# Patient Record
Sex: FEMALE | Race: WHITE
[De-identification: names, ages, dates, MRNs, and addresses within clinical notes are randomized per-mention and may not be internally consistent; named-entity substitution may affect disease eponyms.]

---

## 2022-06-23 ENCOUNTER — HOSPITAL ENCOUNTER (INPATIENT)
Dept: HOSPITAL 54 - ER | Age: 82
LOS: 2 days | Discharge: NURSING, REHAB OR LONG-TERM CARE | DRG: 368 | End: 2022-06-25
Attending: NURSE PRACTITIONER | Admitting: NURSE PRACTITIONER
Payer: COMMERCIAL

## 2022-06-23 VITALS — WEIGHT: 158 LBS | BODY MASS INDEX: 29.83 KG/M2 | HEIGHT: 61 IN

## 2022-06-23 DIAGNOSIS — M19.90: ICD-10-CM

## 2022-06-23 DIAGNOSIS — K21.01: Primary | ICD-10-CM

## 2022-06-23 DIAGNOSIS — F41.9: ICD-10-CM

## 2022-06-23 DIAGNOSIS — K29.71: ICD-10-CM

## 2022-06-23 DIAGNOSIS — F32.9: ICD-10-CM

## 2022-06-23 DIAGNOSIS — K57.30: ICD-10-CM

## 2022-06-23 DIAGNOSIS — Z20.822: ICD-10-CM

## 2022-06-23 DIAGNOSIS — R79.89: ICD-10-CM

## 2022-06-23 DIAGNOSIS — E87.5: ICD-10-CM

## 2022-06-23 DIAGNOSIS — I10: ICD-10-CM

## 2022-06-23 DIAGNOSIS — H93.19: ICD-10-CM

## 2022-06-23 DIAGNOSIS — F02.80: ICD-10-CM

## 2022-06-23 DIAGNOSIS — F25.9: ICD-10-CM

## 2022-06-23 DIAGNOSIS — K44.9: ICD-10-CM

## 2022-06-23 DIAGNOSIS — Z86.16: ICD-10-CM

## 2022-06-23 DIAGNOSIS — E78.5: ICD-10-CM

## 2022-06-23 DIAGNOSIS — G30.0: ICD-10-CM

## 2022-06-23 LAB
ALBUMIN SERPL BCP-MCNC: 3.3 G/DL (ref 3.4–5)
ALP SERPL-CCNC: 72 U/L (ref 46–116)
ALT SERPL W P-5'-P-CCNC: 18 U/L (ref 12–78)
AST SERPL W P-5'-P-CCNC: 36 U/L (ref 15–37)
BASOPHILS # BLD AUTO: 0.1 K/UL (ref 0–0.2)
BASOPHILS NFR BLD AUTO: 1.1 % (ref 0–2)
BILIRUB DIRECT SERPL-MCNC: 0 MG/DL (ref 0–0.2)
BILIRUB SERPL-MCNC: 0.3 MG/DL (ref 0.2–1)
BUN SERPL-MCNC: 24 MG/DL (ref 7–18)
CALCIUM SERPL-MCNC: 9.1 MG/DL (ref 8.5–10.1)
CHLORIDE SERPL-SCNC: 109 MMOL/L (ref 98–107)
CO2 SERPL-SCNC: 22 MMOL/L (ref 21–32)
CREAT SERPL-MCNC: 0.7 MG/DL (ref 0.6–1.3)
EOSINOPHIL NFR BLD AUTO: 2.8 % (ref 0–6)
GLUCOSE SERPL-MCNC: 108 MG/DL (ref 74–106)
HCT VFR BLD AUTO: 38 % (ref 33–45)
HGB BLD-MCNC: 12.6 G/DL (ref 11.5–14.8)
LIPASE SERPL-CCNC: 115 U/L (ref 73–393)
LYMPHOCYTES NFR BLD AUTO: 1.6 K/UL (ref 0.8–4.8)
LYMPHOCYTES NFR BLD AUTO: 23.6 % (ref 20–44)
MCHC RBC AUTO-ENTMCNC: 33 G/DL (ref 31–36)
MCV RBC AUTO: 85 FL (ref 82–100)
MONOCYTES NFR BLD AUTO: 0.6 K/UL (ref 0.1–1.3)
MONOCYTES NFR BLD AUTO: 8.3 % (ref 2–12)
NEUTROPHILS # BLD AUTO: 4.4 K/UL (ref 1.8–8.9)
NEUTROPHILS NFR BLD AUTO: 64.2 % (ref 43–81)
PLATELET # BLD AUTO: 265 K/UL (ref 150–450)
POTASSIUM SERPL-SCNC: 5.3 MMOL/L (ref 3.5–5.1)
PROT SERPL-MCNC: 7.2 G/DL (ref 6.4–8.2)
RBC # BLD AUTO: 4.5 MIL/UL (ref 4–5.2)
SODIUM SERPL-SCNC: 140 MMOL/L (ref 136–145)
WBC NRBC COR # BLD AUTO: 6.8 K/UL (ref 4.3–11)

## 2022-06-23 PROCEDURE — C9803 HOPD COVID-19 SPEC COLLECT: HCPCS

## 2022-06-23 PROCEDURE — C9113 INJ PANTOPRAZOLE SODIUM, VIA: HCPCS

## 2022-06-23 PROCEDURE — G0378 HOSPITAL OBSERVATION PER HR: HCPCS

## 2022-06-23 NOTE — NUR
BIBRA 860 C/O COUGHED UP DARK COFFEE GROUND BLOOD MIXED WITH SPUTUM. (+) COUGH. 
ALSO C/O OF BURPING AND EPISODES OF EMESIS. PLACED COMFORTABLY IN BED. VITALS 
CHECKED. ATTACHED TO MONITOR.

## 2022-06-23 NOTE — NUR
PATIENT IS CONFUSED. REALLY WANTS TO GO HOME. SHE WAS SURPRISED WHY SHE IS IN 
THE ER AND SHE DENIED THAT SHE WAS COUGHING AND VOMITED COFFEE COLORED VOMITUS. 
IT WAS EXPLAINED TO HER THAT SHE NEEDS TO BE ADMITTED TO BE MONITORED.

## 2022-06-24 VITALS — DIASTOLIC BLOOD PRESSURE: 65 MMHG | SYSTOLIC BLOOD PRESSURE: 127 MMHG

## 2022-06-24 VITALS — SYSTOLIC BLOOD PRESSURE: 163 MMHG | DIASTOLIC BLOOD PRESSURE: 88 MMHG

## 2022-06-24 VITALS — DIASTOLIC BLOOD PRESSURE: 86 MMHG | SYSTOLIC BLOOD PRESSURE: 156 MMHG

## 2022-06-24 VITALS — DIASTOLIC BLOOD PRESSURE: 75 MMHG | SYSTOLIC BLOOD PRESSURE: 159 MMHG

## 2022-06-24 VITALS — SYSTOLIC BLOOD PRESSURE: 150 MMHG | DIASTOLIC BLOOD PRESSURE: 46 MMHG

## 2022-06-24 VITALS — DIASTOLIC BLOOD PRESSURE: 66 MMHG | SYSTOLIC BLOOD PRESSURE: 140 MMHG

## 2022-06-24 VITALS — SYSTOLIC BLOOD PRESSURE: 146 MMHG | DIASTOLIC BLOOD PRESSURE: 93 MMHG

## 2022-06-24 VITALS — SYSTOLIC BLOOD PRESSURE: 156 MMHG | DIASTOLIC BLOOD PRESSURE: 78 MMHG

## 2022-06-24 LAB
ALBUMIN SERPL BCP-MCNC: 3.2 G/DL (ref 3.4–5)
ALP SERPL-CCNC: 63 U/L (ref 46–116)
ALT SERPL W P-5'-P-CCNC: 13 U/L (ref 12–78)
AST SERPL W P-5'-P-CCNC: 14 U/L (ref 15–37)
BASOPHILS # BLD AUTO: 0 K/UL (ref 0–0.2)
BASOPHILS NFR BLD AUTO: 0.8 % (ref 0–2)
BILIRUB SERPL-MCNC: 0.3 MG/DL (ref 0.2–1)
BUN SERPL-MCNC: 16 MG/DL (ref 7–18)
CALCIUM SERPL-MCNC: 8.5 MG/DL (ref 8.5–10.1)
CHLORIDE SERPL-SCNC: 110 MMOL/L (ref 98–107)
CO2 SERPL-SCNC: 29 MMOL/L (ref 21–32)
CREAT SERPL-MCNC: 0.8 MG/DL (ref 0.6–1.3)
EOSINOPHIL NFR BLD AUTO: 3.4 % (ref 0–6)
GLUCOSE SERPL-MCNC: 96 MG/DL (ref 74–106)
HCT VFR BLD AUTO: 29 % (ref 33–45)
HCT VFR BLD AUTO: 37 % (ref 33–45)
HGB BLD-MCNC: 12.2 G/DL (ref 11.5–14.8)
HGB BLD-MCNC: 9.6 G/DL (ref 11.5–14.8)
LIPASE SERPL-CCNC: 98 U/L (ref 73–393)
LYMPHOCYTES NFR BLD AUTO: 1.6 K/UL (ref 0.8–4.8)
LYMPHOCYTES NFR BLD AUTO: 30.9 % (ref 20–44)
MAGNESIUM SERPL-MCNC: 2.1 MG/DL (ref 1.8–2.4)
MCHC RBC AUTO-ENTMCNC: 33 G/DL (ref 31–36)
MCV RBC AUTO: 85 FL (ref 82–100)
MONOCYTES NFR BLD AUTO: 0.4 K/UL (ref 0.1–1.3)
MONOCYTES NFR BLD AUTO: 8.2 % (ref 2–12)
NEUTROPHILS # BLD AUTO: 3 K/UL (ref 1.8–8.9)
NEUTROPHILS NFR BLD AUTO: 56.7 % (ref 43–81)
PHOSPHATE SERPL-MCNC: 2.7 MG/DL (ref 2.5–4.9)
PLATELET # BLD AUTO: 267 K/UL (ref 150–450)
POTASSIUM SERPL-SCNC: 4 MMOL/L (ref 3.5–5.1)
PROT SERPL-MCNC: 6.6 G/DL (ref 6.4–8.2)
RBC # BLD AUTO: 4.38 MIL/UL (ref 4–5.2)
SODIUM SERPL-SCNC: 144 MMOL/L (ref 136–145)
WBC NRBC COR # BLD AUTO: 5.3 K/UL (ref 4.3–11)

## 2022-06-24 PROCEDURE — 0DB58ZX EXCISION OF ESOPHAGUS, VIA NATURAL OR ARTIFICIAL OPENING ENDOSCOPIC, DIAGNOSTIC: ICD-10-PCS | Performed by: INTERNAL MEDICINE

## 2022-06-24 RX ADMIN — SODIUM CHLORIDE SCH MG: 9 INJECTION, SOLUTION INTRAVENOUS at 20:58

## 2022-06-24 RX ADMIN — QUETIAPINE SCH MG: 25 TABLET, FILM COATED ORAL at 17:15

## 2022-06-24 RX ADMIN — SUCRALFATE SCH G: 1 SUSPENSION ORAL at 11:54

## 2022-06-24 RX ADMIN — SUCRALFATE SCH G: 1 SUSPENSION ORAL at 17:35

## 2022-06-24 RX ADMIN — SUCRALFATE SCH G: 1 SUSPENSION ORAL at 21:52

## 2022-06-24 RX ADMIN — SODIUM CHLORIDE SCH MG: 9 INJECTION, SOLUTION INTRAVENOUS at 09:06

## 2022-06-24 NOTE — NUR
RN NOTES



CALLED PATIENTS [facility]ACH[facility] PERRY AND OBTAINED CONSENT FOR EGD, ANESTHESIA AND BLOOD 
TRANSFUSION. TELEPHONE/VERBAL CONSENT GIVEN, VERIFIED BY ANOTHER RN REY.

## 2022-06-24 NOTE — NUR
RN NOTE

PT HAD PULLED OUT IV LINE ON R-FA. REINSERTED IV TO L-FA #22G WITH GOOD BLOOD RETURN AND RONAL 
WELL

## 2022-06-24 NOTE — NUR
RN NOTE

PT AWAKE IN BED, ALERT WITH CONFUSION. FREQUENT REORIENTATION PROVIDED. RESPIRATIONS 
EVEN/UNLABORED. PT AMBULATED TO BR WITH STANDBY ASSIST PROVIDED. PT WITH NO BLEEDING NOTED. 
NPO OBSERVED AS ORDERED. PT WITH NO C/O PAIN DURING THE SHIFT. ASSIST WITH ALL ADL'S 
PROVIDED. NO ACUTE DISTRESS NOTED. SAFETY MEASURES MAINTAINED.

## 2022-06-24 NOTE — NUR
MS/RN ADMISSION NOTE

RECEIVED PT FROM E.R. @0045 VIA GURNEY TO [id number]. PT CAME FROM [facility], SENT 
TO [facility] D/T COFFEE-GROUND EMESIS. PT AWAKE, ALERT TO NAME, UNDERSTANDS CONCEPTS AND ABLE TO 
CARRY ON CONVERSATIONS, BUT WITH CONFUSION/DISORIENTED TO TIME/PLACE, DOES NOT KNOW WHY 
SHE'S HERE. PT ABLE TO AMBULATE WITH SLOW STEADY GAIT. IV ACCESS: R-FA #22G 
INTACT/PATENT/FLUSHES WELL. STARTED NS @75ML/HR AS ORDERED. PT IN NO ACUTE DISTRESS. SAFETY 
MEASURES IN PLACE, BED IN LOWEST LOCKED POSITION, S/R UP X2, BED ALARM ON, CALL LIGHT WITHIN 
REACH. WILL CONT TO MONITOR.

## 2022-06-24 NOTE — NUR
received patient  alert and orientated X2 smiling and ambulating in the corridor steady on 
her legs

refusing IV placement

## 2022-06-24 NOTE — NUR
RN OPENING NOTES



RECEIVED PATIENT IN BED, AWAKE, VERBALLY RESPONSIVE. NOTED WITH CONFUSION. REORIENTED AS 
NEEDED. NO SIGNS OF ACUTE DISTRESS NOTED. STABLE ON ROOM AIR, NO SOB NOTED, BREATHING EVEN 
AND UNLABORED. DENIES ANY PAIN AT THIS TIME. NOTED WITH IV ACCESS ON LEFT FORE ARM #22G, 
INTACT AND PATENT WITH NS @ 75ML/HR RUNNING. SAFETY MEASURE IN PLACE. BED IN LOWEST AND 
LOCKED POSITION, SIDE RAILS UP X2, CALL LIGHT PLACED WITHIN EASY REACH. WILL CONTINUE TO 
MONITOR PATIENT.

## 2022-06-24 NOTE — NUR
RN NOTE



PATIENT BACK FROM EGD, TRANSPORTED VIA BED ACCOMPANIED BY KARISSA ROBERT, IN STABLE CONDITION. 
VITAL SIGNS TAKEN AS FOLLOWS: 127/65, 62, 98.0, 18, SPO2 100% RA. WILL CONTINUE TO MONITOR 
PATIENT.

## 2022-06-24 NOTE — NUR
MS  RN  CLOSING  NOTES  



 PATIENT AWAKE ,  AMBULATING  , VERBALLY RESPONSIVE. NOTED WITH CONFUSION. REORIENTED AS 
NEEDED.ALL DUE MEDS  GIVEN  .  NO SIGNS OF ACUTE DISTRESS NOTED. STABLE ON ROOM AIR, NO SOB 
NOTED, BREATHING EVEN AND UNLABORED. DENIES ANY PAIN AT THIS TIME. EGD  WAS  DONE  AND STILL 
 WATING  FOR  THE  RESULT  AND  CAME  BACK  STABLE  . IV ACCESS  PULLED  OUT  AND  REFUSED  
TO  BE  REINSERTED  . SAFETY MEASURE IN PLACE. BED IN LOWEST AND LOCKED POSITION, SIDE RAILS 
UP X2, CALL LIGHT PLACED WITHIN EASY REACH. ENDORSED  TO THE  NEXT SHIFT

## 2022-06-25 VITALS — SYSTOLIC BLOOD PRESSURE: 167 MMHG | DIASTOLIC BLOOD PRESSURE: 78 MMHG

## 2022-06-25 VITALS — SYSTOLIC BLOOD PRESSURE: 153 MMHG | DIASTOLIC BLOOD PRESSURE: 85 MMHG

## 2022-06-25 RX ADMIN — QUETIAPINE SCH MG: 25 TABLET, FILM COATED ORAL at 10:03

## 2022-06-25 RX ADMIN — SODIUM CHLORIDE SCH MG: 9 INJECTION, SOLUTION INTRAVENOUS at 10:03

## 2022-06-25 RX ADMIN — SUCRALFATE SCH G: 1 SUSPENSION ORAL at 12:37

## 2022-06-25 RX ADMIN — SUCRALFATE SCH G: 1 SUSPENSION ORAL at 12:00

## 2022-06-25 RX ADMIN — IPRATROPIUM BROMIDE SCH ML: 21 SPRAY, METERED NASAL at 12:46

## 2022-06-25 RX ADMIN — SUCRALFATE SCH G: 1 SUSPENSION ORAL at 10:07

## 2022-06-25 RX ADMIN — QUETIAPINE SCH MG: 25 TABLET, FILM COATED ORAL at 17:00

## 2022-06-25 RX ADMIN — SUCRALFATE SCH G: 1 SUSPENSION ORAL at 17:30

## 2022-06-25 RX ADMIN — IPRATROPIUM BROMIDE SCH ML: 21 SPRAY, METERED NASAL at 17:00

## 2022-06-25 NOTE — NUR
RN CLOSING NOTE

PATIENT DISCHARGED TO [facility] VIA AMBULANCE CREW. REPORT 
RECEIVED BY ANUM ROBERT AT [facility]. DISCHARGE PACKET AND EDUCATIONAL MATERIALS EXPLAINED 
AND GIVEN TO PATIENT. PATIENT IS NOT IN DISTRESS AT TIME OF DISCHARGE. WITH VITAL SIGNS OF T 
98.1, RR 20, , /85, O2 SAT AT 97%.

## 2022-06-25 NOTE — NUR
RN NOTE

PATIENT REFUSED SUCRALFATE ORAL SUSPENSION. STATES SHE WANTS TO GO HOME. EXPLAINED TO HER 
THE NEED FOR TAKING THE MEDICATION BUT PATIENT STILL REFUSED.

## 2022-06-25 NOTE — NUR
RN OPENING NOTE

PATIENT IS IN BED AWAKE, ALERT ORIENTED X 2. ON ROOM AIR SATTING AT 97%. DENIES PAIN, NOT IN 
ANY FORM OF DISTRESS. BED IS LOCKED IN LOWEST POSITION, 3 SIDE RAILS UP, CALL LIGHT WITHIN 
REACH. WILL MONITOR THROUGHOUT SHIFT.

## 2022-06-25 NOTE — NUR
ENDING NOTES:  alert and orientated X2

ambulates the corridors steady on her legs pleasent and friendly and is coop[erative

slept in bed 9 hours this night

no sob on room no noted bleeding or c/o discomfort

refusing to have an IV replaced pulled it out on day shift